# Patient Record
Sex: FEMALE | URBAN - METROPOLITAN AREA
[De-identification: names, ages, dates, MRNs, and addresses within clinical notes are randomized per-mention and may not be internally consistent; named-entity substitution may affect disease eponyms.]

---

## 2018-01-16 ENCOUNTER — APPOINTMENT (RX ONLY)
Dept: URBAN - METROPOLITAN AREA CLINIC 16 | Facility: CLINIC | Age: 83
Setting detail: DERMATOLOGY
End: 2018-01-16

## 2018-01-16 DIAGNOSIS — L72.0 EPIDERMAL CYST: ICD-10-CM

## 2018-01-16 DIAGNOSIS — L57.8 OTHER SKIN CHANGES DUE TO CHRONIC EXPOSURE TO NONIONIZING RADIATION: ICD-10-CM

## 2018-01-16 DIAGNOSIS — D22 MELANOCYTIC NEVI: ICD-10-CM

## 2018-01-16 DIAGNOSIS — L57.0 ACTINIC KERATOSIS: ICD-10-CM

## 2018-01-16 DIAGNOSIS — Z71.89 OTHER SPECIFIED COUNSELING: ICD-10-CM

## 2018-01-16 PROBLEM — I10 ESSENTIAL (PRIMARY) HYPERTENSION: Status: ACTIVE | Noted: 2018-01-16

## 2018-01-16 PROBLEM — D22.39 MELANOCYTIC NEVI OF OTHER PARTS OF FACE: Status: ACTIVE | Noted: 2018-01-16

## 2018-01-16 PROBLEM — E78.5 HYPERLIPIDEMIA, UNSPECIFIED: Status: ACTIVE | Noted: 2018-01-16

## 2018-01-16 PROCEDURE — ? OTHER

## 2018-01-16 PROCEDURE — ? LIQUID NITROGEN

## 2018-01-16 PROCEDURE — ? PRESCRIPTION

## 2018-01-16 PROCEDURE — ? COUNSELING

## 2018-01-16 PROCEDURE — ? TREATMENT REGIMEN

## 2018-01-16 PROCEDURE — 17000 DESTRUCT PREMALG LESION: CPT

## 2018-01-16 PROCEDURE — 17003 DESTRUCT PREMALG LES 2-14: CPT

## 2018-01-16 PROCEDURE — 99213 OFFICE O/P EST LOW 20 MIN: CPT | Mod: 25

## 2018-01-16 RX ORDER — IMIQUIMOD 50 MG/G
CREAM TOPICAL
Qty: 6 | Refills: 1 | Status: ERX | COMMUNITY
Start: 2018-01-16

## 2018-01-16 RX ADMIN — IMIQUIMOD: 50 CREAM TOPICAL at 20:12

## 2018-01-16 ASSESSMENT — LOCATION ZONE DERM
LOCATION ZONE: LIP
LOCATION ZONE: FACE
LOCATION ZONE: NOSE

## 2018-01-16 ASSESSMENT — LOCATION DETAILED DESCRIPTION DERM
LOCATION DETAILED: LEFT NASAL ALA
LOCATION DETAILED: RIGHT LOWER CUTANEOUS LIP
LOCATION DETAILED: NASAL ROOT
LOCATION DETAILED: LEFT INFERIOR MEDIAL FOREHEAD
LOCATION DETAILED: RIGHT MEDIAL EYEBROW
LOCATION DETAILED: RIGHT FOREHEAD

## 2018-01-16 ASSESSMENT — LOCATION SIMPLE DESCRIPTION DERM
LOCATION SIMPLE: LEFT FOREHEAD
LOCATION SIMPLE: NOSE
LOCATION SIMPLE: RIGHT FOREHEAD
LOCATION SIMPLE: RIGHT EYEBROW
LOCATION SIMPLE: RIGHT LIP
LOCATION SIMPLE: LEFT NOSE

## 2018-01-16 NOTE — HPI: SKIN LESION
How Severe Is Your Skin Lesion?: mild
Has Your Skin Lesion Been Treated?: not been treated
Is This A New Presentation, Or A Follow-Up?: Skin Lesion
Additional History: Patient was referred by Dr. Geovanna Queen to rule out skin cancer. Patient has treated at home with neosporin and lesion has decreased in size.

## 2018-01-16 NOTE — PROCEDURE: LIQUID NITROGEN
Duration Of Freeze Thaw-Cycle (Seconds): 2
Number Of Freeze-Thaw Cycles: 3 freeze-thaw cycles
Render Post-Care Instructions In Note?: no
Consent: The patient's consent was obtained including but not limited to risks of crusting, scabbing, blistering, scarring, darker or lighter pigmentary change, recurrence, incomplete removal and infection.
Detail Level: Simple
Post-Care Instructions: I reviewed with the patient in detail post-care instructions. Patient is to wear sunprotection, and avoid picking at any of the treated lesions. Pt may apply Vaseline to crusted or scabbing areas.

## 2018-01-16 NOTE — PROCEDURE: OTHER
Other (Free Text): Patient declined biopsy today. Discussed in detail with patient if lesion is not resolved in 8 weeks following cryosurgery today and after home treatment with Imiquimod 5% cream would recommend biopsy or referral to plastic surgery for removal.
Detail Level: Zone
Note Text (......Xxx Chief Complaint.): This diagnosis correlates with the

## 2021-01-07 ENCOUNTER — NEW REFERRAL (OUTPATIENT)
Dept: URBAN - METROPOLITAN AREA CLINIC 26 | Facility: CLINIC | Age: 86
End: 2021-01-07

## 2021-01-07 VITALS
SYSTOLIC BLOOD PRESSURE: 154 MMHG | BODY MASS INDEX: 22.22 KG/M2 | WEIGHT: 103 LBS | HEIGHT: 57 IN | HEART RATE: 74 BPM | DIASTOLIC BLOOD PRESSURE: 104 MMHG

## 2021-01-07 DIAGNOSIS — H33.101: ICD-10-CM

## 2021-01-07 DIAGNOSIS — H35.3132: ICD-10-CM

## 2021-01-07 DIAGNOSIS — H35.371: ICD-10-CM

## 2021-01-07 PROCEDURE — 99204 OFFICE O/P NEW MOD 45 MIN: CPT

## 2021-01-07 PROCEDURE — 92250 FUNDUS PHOTOGRAPHY W/I&R: CPT

## 2021-01-07 ASSESSMENT — TONOMETRY
OS_IOP_MMHG: 11
OD_IOP_MMHG: 12

## 2021-01-07 ASSESSMENT — VISUAL ACUITY
OD_SC: 20/100+2
OS_SC: 20/20-2
OD_CC: 20/200+2
OS_CC: 20/20-1

## 2021-02-26 ENCOUNTER — PRE-OP VISIT (OUTPATIENT)
Dept: URBAN - METROPOLITAN AREA CLINIC 26 | Facility: CLINIC | Age: 86
End: 2021-02-26

## 2021-02-26 DIAGNOSIS — H33.101: ICD-10-CM

## 2021-02-26 DIAGNOSIS — H35.371: ICD-10-CM

## 2021-02-26 DIAGNOSIS — H35.3132: ICD-10-CM

## 2021-02-26 PROCEDURE — 92250 FUNDUS PHOTOGRAPHY W/I&R: CPT

## 2021-02-26 PROCEDURE — 92012 INTRM OPH EXAM EST PATIENT: CPT

## 2021-02-26 RX ORDER — TOBRAMYCIN 3 MG/ML: 1 SOLUTION/ DROPS OPHTHALMIC

## 2021-02-26 ASSESSMENT — TONOMETRY
OD_IOP_MMHG: 13
OS_IOP_MMHG: 14

## 2021-02-26 ASSESSMENT — VISUAL ACUITY
OS_SC: 20/25-1
OD_SC: 20/100-2

## 2021-03-11 ENCOUNTER — SURGERY/PROCEDURE (OUTPATIENT)
Dept: URBAN - METROPOLITAN AREA SURGERY 10 | Facility: SURGERY | Age: 86
End: 2021-03-11

## 2021-03-11 DIAGNOSIS — H35.371: ICD-10-CM

## 2021-03-11 PROBLEM — Z98.890: Noted: 2021-03-11

## 2021-03-11 PROCEDURE — 67042 VIT FOR MACULAR HOLE: CPT

## 2021-03-11 PROCEDURE — 0356T INSERTION OF DRUG-ELUTING IMPLANT (INCLUDING PUNCTAL DILATION AND IMPLANT REMOVAL WHEN PERFORMED) INTO LACRIMAL CANALICULUS, EACH: CPT

## 2021-03-12 ENCOUNTER — POST OP-DILATION (OUTPATIENT)
Dept: URBAN - METROPOLITAN AREA CLINIC 26 | Facility: CLINIC | Age: 86
End: 2021-03-12

## 2021-03-12 DIAGNOSIS — H35.371: ICD-10-CM

## 2021-03-12 DIAGNOSIS — Z98.890: ICD-10-CM

## 2021-03-12 PROCEDURE — 99024 POSTOP FOLLOW-UP VISIT: CPT

## 2021-03-12 ASSESSMENT — VISUAL ACUITY
OS_SC: 20/25-2
OD_SC: 20/400-2

## 2021-03-12 ASSESSMENT — TONOMETRY
OS_IOP_MMHG: 14
OD_IOP_MMHG: 13

## 2021-03-17 ENCOUNTER — POST-OP CHECK (OUTPATIENT)
Dept: URBAN - METROPOLITAN AREA CLINIC 26 | Facility: CLINIC | Age: 86
End: 2021-03-17

## 2021-03-17 VITALS — HEIGHT: 55 IN | SYSTOLIC BLOOD PRESSURE: 160 MMHG | DIASTOLIC BLOOD PRESSURE: 82 MMHG | HEART RATE: 68 BPM

## 2021-03-17 DIAGNOSIS — Z98.890: ICD-10-CM

## 2021-03-17 DIAGNOSIS — H35.371: ICD-10-CM

## 2021-03-17 PROCEDURE — 99024 POSTOP FOLLOW-UP VISIT: CPT

## 2021-03-17 ASSESSMENT — TONOMETRY
OD_IOP_MMHG: 19
OS_IOP_MMHG: 13

## 2021-03-17 ASSESSMENT — VISUAL ACUITY
OD_SC: 20/40-2
OS_SC: 20/30-2

## 2021-03-23 ENCOUNTER — POST-OP CHECK (OUTPATIENT)
Dept: URBAN - METROPOLITAN AREA CLINIC 26 | Facility: CLINIC | Age: 86
End: 2021-03-23

## 2021-03-23 DIAGNOSIS — H35.371: ICD-10-CM

## 2021-03-23 DIAGNOSIS — Z98.890: ICD-10-CM

## 2021-03-23 PROCEDURE — 99024 POSTOP FOLLOW-UP VISIT: CPT

## 2021-03-23 ASSESSMENT — VISUAL ACUITY
OS_SC: 20/30
OD_SC: 20/40-1

## 2021-03-23 ASSESSMENT — TONOMETRY
OD_IOP_MMHG: 14
OS_IOP_MMHG: 15

## 2021-04-20 ENCOUNTER — POST-OP CHECK (OUTPATIENT)
Dept: URBAN - METROPOLITAN AREA CLINIC 26 | Facility: CLINIC | Age: 86
End: 2021-04-20

## 2021-04-20 DIAGNOSIS — H35.371: ICD-10-CM

## 2021-04-20 DIAGNOSIS — H35.3132: ICD-10-CM

## 2021-04-20 DIAGNOSIS — Z98.890: ICD-10-CM

## 2021-04-20 PROCEDURE — 99024 POSTOP FOLLOW-UP VISIT: CPT

## 2021-04-20 PROCEDURE — 92250 FUNDUS PHOTOGRAPHY W/I&R: CPT

## 2021-04-20 ASSESSMENT — VISUAL ACUITY
OD_CC: 20/40
OS_CC: 20/25

## 2021-04-20 ASSESSMENT — TONOMETRY
OS_IOP_MMHG: 11
OD_IOP_MMHG: 13

## 2021-06-29 ENCOUNTER — FOLLOW UP (OUTPATIENT)
Dept: URBAN - METROPOLITAN AREA CLINIC 26 | Facility: CLINIC | Age: 86
End: 2021-06-29

## 2021-06-29 DIAGNOSIS — H35.371: ICD-10-CM

## 2021-06-29 DIAGNOSIS — H33.101: ICD-10-CM

## 2021-06-29 DIAGNOSIS — H35.3132: ICD-10-CM

## 2021-06-29 PROCEDURE — 92014 COMPRE OPH EXAM EST PT 1/>: CPT

## 2021-06-29 PROCEDURE — 92250 FUNDUS PHOTOGRAPHY W/I&R: CPT

## 2021-06-29 ASSESSMENT — VISUAL ACUITY
OU_SC: 20/30+1
OS_CC: 20/30+2
OS_SC: 20/30-1
OU_CC: 20/30+2
OD_SC: 20/50-1
OD_CC: 20/30-2

## 2021-06-29 ASSESSMENT — TONOMETRY
OS_IOP_MMHG: 13
OD_IOP_MMHG: 14

## 2021-12-17 ENCOUNTER — FOLLOW UP (OUTPATIENT)
Dept: URBAN - METROPOLITAN AREA CLINIC 26 | Facility: CLINIC | Age: 86
End: 2021-12-17

## 2021-12-17 DIAGNOSIS — H35.371: ICD-10-CM

## 2021-12-17 DIAGNOSIS — H04.123: ICD-10-CM

## 2021-12-17 DIAGNOSIS — H35.3132: ICD-10-CM

## 2021-12-17 DIAGNOSIS — H33.101: ICD-10-CM

## 2021-12-17 PROCEDURE — 92014 COMPRE OPH EXAM EST PT 1/>: CPT

## 2021-12-17 PROCEDURE — 92134 CPTRZ OPH DX IMG PST SGM RTA: CPT

## 2021-12-17 ASSESSMENT — VISUAL ACUITY
OS_CC: 20/20
OD_CC: 20/25-2

## 2021-12-17 ASSESSMENT — TONOMETRY
OS_IOP_MMHG: 12
OD_IOP_MMHG: 14

## 2023-04-25 ENCOUNTER — EMERGENCY VISIT (OUTPATIENT)
Dept: URBAN - METROPOLITAN AREA CLINIC 26 | Facility: CLINIC | Age: 88
End: 2023-04-25

## 2023-04-25 VITALS — WEIGHT: 93 LBS | HEIGHT: 57 IN | BODY MASS INDEX: 20.06 KG/M2

## 2023-04-25 DIAGNOSIS — H53.8: ICD-10-CM

## 2023-04-25 DIAGNOSIS — H33.101: ICD-10-CM

## 2023-04-25 DIAGNOSIS — H35.371: ICD-10-CM

## 2023-04-25 DIAGNOSIS — H04.123: ICD-10-CM

## 2023-04-25 DIAGNOSIS — H35.3132: ICD-10-CM

## 2023-04-25 PROCEDURE — 92014 COMPRE OPH EXAM EST PT 1/>: CPT

## 2023-04-25 ASSESSMENT — TONOMETRY
OD_IOP_MMHG: 14
OS_IOP_MMHG: 13

## 2023-04-25 ASSESSMENT — VISUAL ACUITY
OD_CC: 20/30+1
OS_CC: 20/30+2

## 2024-04-04 ENCOUNTER — FOLLOW UP (OUTPATIENT)
Dept: URBAN - METROPOLITAN AREA CLINIC 26 | Facility: CLINIC | Age: 89
End: 2024-04-04

## 2024-04-04 VITALS — BODY MASS INDEX: 21.79 KG/M2 | WEIGHT: 101 LBS | HEIGHT: 57 IN

## 2024-04-04 DIAGNOSIS — H02.833: ICD-10-CM

## 2024-04-04 DIAGNOSIS — H04.123: ICD-10-CM

## 2024-04-04 DIAGNOSIS — H02.836: ICD-10-CM

## 2024-04-04 DIAGNOSIS — H35.371: ICD-10-CM

## 2024-04-04 DIAGNOSIS — H33.101: ICD-10-CM

## 2024-04-04 DIAGNOSIS — H35.3132: ICD-10-CM

## 2024-04-04 DIAGNOSIS — H35.351: ICD-10-CM

## 2024-04-04 DIAGNOSIS — H53.8: ICD-10-CM

## 2024-04-04 DIAGNOSIS — Z96.1: ICD-10-CM

## 2024-04-04 PROCEDURE — 92014 COMPRE OPH EXAM EST PT 1/>: CPT

## 2024-04-04 PROCEDURE — 92250 FUNDUS PHOTOGRAPHY W/I&R: CPT | Mod: NC

## 2024-04-04 PROCEDURE — 92134 CPTRZ OPH DX IMG PST SGM RTA: CPT

## 2024-04-04 RX ORDER — KETOROLAC TROMETHAMINE 5 MG/ML: 1 SOLUTION OPHTHALMIC EVERY EVENING

## 2024-04-04 ASSESSMENT — VISUAL ACUITY
OS_CC: 20/40+1
OD_CC: 20/40+1

## 2024-04-04 ASSESSMENT — TONOMETRY
OS_IOP_MMHG: 13
OD_IOP_MMHG: 12

## 2024-08-22 ENCOUNTER — COMPREHENSIVE EXAM (OUTPATIENT)
Dept: URBAN - METROPOLITAN AREA CLINIC 26 | Facility: CLINIC | Age: 89
End: 2024-08-22

## 2024-08-22 DIAGNOSIS — Z96.1: ICD-10-CM

## 2024-08-22 DIAGNOSIS — H35.371: ICD-10-CM

## 2024-08-22 DIAGNOSIS — H04.123: ICD-10-CM

## 2024-08-22 DIAGNOSIS — R42: ICD-10-CM

## 2024-08-22 DIAGNOSIS — H35.3132: ICD-10-CM

## 2024-08-22 DIAGNOSIS — H35.351: ICD-10-CM

## 2024-08-22 DIAGNOSIS — H33.101: ICD-10-CM

## 2024-08-22 DIAGNOSIS — H02.833: ICD-10-CM

## 2024-08-22 DIAGNOSIS — H02.836: ICD-10-CM

## 2024-08-22 PROCEDURE — 92134 CPTRZ OPH DX IMG PST SGM RTA: CPT

## 2024-08-22 PROCEDURE — 92014 COMPRE OPH EXAM EST PT 1/>: CPT

## 2024-08-22 ASSESSMENT — TONOMETRY
OS_IOP_MMHG: 19
OD_IOP_MMHG: 19

## 2024-08-22 ASSESSMENT — VISUAL ACUITY
OS_CC: 20/40-1
OS_PH: 20/25-1
OD_CC: 20/40-2

## 2024-10-04 ENCOUNTER — COMPREHENSIVE EXAM (OUTPATIENT)
Dept: URBAN - METROPOLITAN AREA CLINIC 26 | Facility: CLINIC | Age: 89
End: 2024-10-04

## 2024-10-04 DIAGNOSIS — R42: ICD-10-CM

## 2024-10-04 DIAGNOSIS — H04.123: ICD-10-CM

## 2024-10-04 DIAGNOSIS — H35.3132: ICD-10-CM

## 2024-10-04 DIAGNOSIS — H35.371: ICD-10-CM

## 2024-10-04 DIAGNOSIS — H35.041: ICD-10-CM

## 2024-10-04 DIAGNOSIS — H35.351: ICD-10-CM

## 2024-10-04 DIAGNOSIS — Z96.1: ICD-10-CM

## 2024-10-04 DIAGNOSIS — H33.101: ICD-10-CM

## 2024-10-04 DIAGNOSIS — H02.836: ICD-10-CM

## 2024-10-04 DIAGNOSIS — H02.833: ICD-10-CM

## 2024-10-04 PROCEDURE — 99213 OFFICE O/P EST LOW 20 MIN: CPT

## 2024-10-04 PROCEDURE — 92134 CPTRZ OPH DX IMG PST SGM RTA: CPT

## 2024-10-04 PROCEDURE — 67515 INJECT/TREAT EYE SOCKET: CPT

## 2024-10-04 PROCEDURE — 92250 FUNDUS PHOTOGRAPHY W/I&R: CPT

## 2024-12-18 ENCOUNTER — COMPREHENSIVE EXAM (OUTPATIENT)
Age: 89
End: 2024-12-18

## 2024-12-18 DIAGNOSIS — H33.101: ICD-10-CM

## 2024-12-18 DIAGNOSIS — H04.123: ICD-10-CM

## 2024-12-18 DIAGNOSIS — H35.351: ICD-10-CM

## 2024-12-18 DIAGNOSIS — H35.371: ICD-10-CM

## 2024-12-18 DIAGNOSIS — Z96.1: ICD-10-CM

## 2024-12-18 DIAGNOSIS — H35.3113: ICD-10-CM

## 2024-12-18 DIAGNOSIS — R42: ICD-10-CM

## 2024-12-18 DIAGNOSIS — H35.041: ICD-10-CM

## 2024-12-18 DIAGNOSIS — H35.3132: ICD-10-CM

## 2024-12-18 DIAGNOSIS — H02.836: ICD-10-CM

## 2024-12-18 DIAGNOSIS — H02.833: ICD-10-CM

## 2024-12-18 PROCEDURE — 92014 COMPRE OPH EXAM EST PT 1/>: CPT

## 2024-12-18 PROCEDURE — 92134 CPTRZ OPH DX IMG PST SGM RTA: CPT

## 2024-12-18 PROCEDURE — 92250 FUNDUS PHOTOGRAPHY W/I&R: CPT

## 2025-04-10 ENCOUNTER — COMPREHENSIVE EXAM (OUTPATIENT)
Age: OVER 89
End: 2025-04-10

## 2025-04-10 VITALS — WEIGHT: 98 LBS | BODY MASS INDEX: 21.14 KG/M2 | HEIGHT: 57 IN

## 2025-04-10 DIAGNOSIS — Z96.1: ICD-10-CM

## 2025-04-10 DIAGNOSIS — H35.041: ICD-10-CM

## 2025-04-10 DIAGNOSIS — H35.351: ICD-10-CM

## 2025-04-10 DIAGNOSIS — H33.101: ICD-10-CM

## 2025-04-10 DIAGNOSIS — H04.123: ICD-10-CM

## 2025-04-10 DIAGNOSIS — H02.833: ICD-10-CM

## 2025-04-10 DIAGNOSIS — H35.371: ICD-10-CM

## 2025-04-10 DIAGNOSIS — R42: ICD-10-CM

## 2025-04-10 DIAGNOSIS — H35.3132: ICD-10-CM

## 2025-04-10 DIAGNOSIS — H02.836: ICD-10-CM

## 2025-04-10 DIAGNOSIS — H35.3113: ICD-10-CM

## 2025-04-10 PROCEDURE — 92014 COMPRE OPH EXAM EST PT 1/>: CPT

## 2025-04-10 PROCEDURE — 92250 FUNDUS PHOTOGRAPHY W/I&R: CPT

## 2025-04-10 PROCEDURE — 92134 CPTRZ OPH DX IMG PST SGM RTA: CPT

## 2025-06-13 ENCOUNTER — FOLLOW UP (OUTPATIENT)
Age: OVER 89
End: 2025-06-13

## 2025-06-13 DIAGNOSIS — Z96.1: ICD-10-CM

## 2025-06-13 DIAGNOSIS — R42: ICD-10-CM

## 2025-06-13 DIAGNOSIS — H40.1134: ICD-10-CM

## 2025-06-13 DIAGNOSIS — H35.3113: ICD-10-CM

## 2025-06-13 DIAGNOSIS — H35.371: ICD-10-CM

## 2025-06-13 DIAGNOSIS — H35.351: ICD-10-CM

## 2025-06-13 DIAGNOSIS — H35.3132: ICD-10-CM

## 2025-06-13 DIAGNOSIS — H02.836: ICD-10-CM

## 2025-06-13 DIAGNOSIS — H02.833: ICD-10-CM

## 2025-06-13 DIAGNOSIS — H33.101: ICD-10-CM

## 2025-06-13 DIAGNOSIS — H35.041: ICD-10-CM

## 2025-06-13 DIAGNOSIS — H04.123: ICD-10-CM

## 2025-06-13 PROCEDURE — 92134 CPTRZ OPH DX IMG PST SGM RTA: CPT

## 2025-06-13 PROCEDURE — 92250 FUNDUS PHOTOGRAPHY W/I&R: CPT

## 2025-06-13 PROCEDURE — 99213 OFFICE O/P EST LOW 20 MIN: CPT

## 2025-08-15 ENCOUNTER — COMPREHENSIVE EXAM (OUTPATIENT)
Age: OVER 89
End: 2025-08-15

## 2025-08-15 DIAGNOSIS — H04.123: ICD-10-CM

## 2025-08-15 DIAGNOSIS — H02.836: ICD-10-CM

## 2025-08-15 DIAGNOSIS — H33.101: ICD-10-CM

## 2025-08-15 DIAGNOSIS — H35.371: ICD-10-CM

## 2025-08-15 DIAGNOSIS — H35.3133: ICD-10-CM

## 2025-08-15 DIAGNOSIS — H40.1134: ICD-10-CM

## 2025-08-15 DIAGNOSIS — H02.833: ICD-10-CM

## 2025-08-15 DIAGNOSIS — Z96.1: ICD-10-CM

## 2025-08-15 DIAGNOSIS — H35.3132: ICD-10-CM

## 2025-08-15 DIAGNOSIS — H35.351: ICD-10-CM

## 2025-08-15 DIAGNOSIS — R42: ICD-10-CM

## 2025-08-15 DIAGNOSIS — H35.041: ICD-10-CM

## 2025-08-15 PROCEDURE — 99213 OFFICE O/P EST LOW 20 MIN: CPT

## 2025-08-15 PROCEDURE — 92134 CPTRZ OPH DX IMG PST SGM RTA: CPT

## 2025-08-15 PROCEDURE — 92250 FUNDUS PHOTOGRAPHY W/I&R: CPT
